# Patient Record
Sex: FEMALE | Race: WHITE
[De-identification: names, ages, dates, MRNs, and addresses within clinical notes are randomized per-mention and may not be internally consistent; named-entity substitution may affect disease eponyms.]

---

## 2018-04-21 ENCOUNTER — HEALTH MAINTENANCE LETTER (OUTPATIENT)
Age: 33
End: 2018-04-21

## 2019-01-18 ENCOUNTER — OFFICE VISIT (OUTPATIENT)
Dept: PEDIATRICS | Facility: CLINIC | Age: 34
End: 2019-01-18
Payer: COMMERCIAL

## 2019-01-18 VITALS
OXYGEN SATURATION: 97 % | HEIGHT: 66 IN | BODY MASS INDEX: 38.39 KG/M2 | DIASTOLIC BLOOD PRESSURE: 76 MMHG | TEMPERATURE: 99.8 F | HEART RATE: 98 BPM | SYSTOLIC BLOOD PRESSURE: 124 MMHG | WEIGHT: 238.9 LBS

## 2019-01-18 DIAGNOSIS — T78.3XXA ANGIOEDEMA, INITIAL ENCOUNTER: Primary | ICD-10-CM

## 2019-01-18 PROCEDURE — 99213 OFFICE O/P EST LOW 20 MIN: CPT | Performed by: INTERNAL MEDICINE

## 2019-01-18 RX ORDER — PREDNISONE 20 MG/1
40 TABLET ORAL DAILY
Qty: 10 TABLET | Refills: 0 | Status: SHIPPED | OUTPATIENT
Start: 2019-01-18 | End: 2019-01-23

## 2019-01-18 RX ORDER — LEVOCETIRIZINE DIHYDROCHLORIDE 5 MG/1
5 TABLET, FILM COATED ORAL EVERY EVENING
COMMUNITY

## 2019-01-18 ASSESSMENT — MIFFLIN-ST. JEOR: SCORE: 1792.45

## 2019-01-18 NOTE — PATIENT INSTRUCTIONS
1. Prednisone 40 mg once a day for 5 days   - take with food  - take today and then move to the morning  2. Schedule with Dr. Mcintyre  3. Follow-up if worsening or not improving

## 2019-01-18 NOTE — PROGRESS NOTES
SUBJECTIVE:   Aleksandra Ba is a 34 year old female who presents to clinic today for the following health issues:      Allergic response      Duration: 2 days    Description (location/character/radiation): eyes swollen    Intensity:  severe    Accompanying signs and symptoms: none    History (similar episodes/previous evaluation): None    Precipitating or alleviating factors: ate avocado for dinner, also has h/o angioedema    Therapies tried and outcome: Benadryl     History of angioedema - doesn't take NSAIDs, red wine. Sometimes flares up without any known trigger. Ate avocado for dinner the night this occurred. Has had in the past and was fine. Has had angioedema since age 12. Has had any portion of face affected. Now happens a few times a year. Usually lasts about a day. The last few years has taken longer to resolve, but overall episodes are less severe. Has not had airway involvement for a long time.    This time, had swelling of both eyes. Yesterday right eye almost completely swollen shut. That is better today and now left almost completely swollen shut. Taking benadryl. Hasn't really helped. Has used prednisone in past. Not sure if it helps. Wouldn't have come in, but needs letter for work    Has met with allergist. Has not had testing for hereditary angioedema done. Only a couple of people in the [facility] test for this. Dr. Mcintyre with Immunology is one of them. He books out 6 months. Doesn't have schedule that far out and difficult to book.    Reviewed and updated as needed this visit by clinical staff  Tobacco  Allergies  Meds  Problems  Med Hx  Surg Hx  Fam Hx  Soc Hx        Reviewed and updated as needed this visit by Provider  Tobacco  Allergies  Meds  Problems  Med Hx  Surg Hx  Fam Hx         -------------------------------------    ROS:  Constitutional, HEENT, cardiovascular, pulmonary, gi and gu systems are negative, except as otherwise noted.    OBJECTIVE:                    "                                 /76 (BP Location: Right arm, Patient Position: Sitting, Cuff Size: Adult Large)   Pulse 98   Temp 99.8  F (37.7  C) (Tympanic)   Ht 1.664 m (5' 5.5\")   Wt 108.4 kg (238 lb 14.4 oz)   SpO2 97%   BMI 39.15 kg/m     Body mass index is 39.15 kg/m .  General Appearance: healthy, alert and no distress  Eyes: right eye with slight swelling under eye. Normal conjunctiva. Left eye completely swollen shut. Unable to see conjunctiva. Skin not erythematous.  Neck: Supple.  No adenopathy, no asymmetry  Respiratory: lungs clear to auscultation - no rales, rhonchi or wheezes.      Diagnostic Test Results:  none      ASSESSMENT/PLAN:                                                      (T78.3XXA) Angioedema, initial encounter  (primary encounter diagnosis)  Comment: given history and the fact eyes are switching most likely angioedema. Also could be preseptal cellulitis but much less likely without erythema and without it being the same eye both days  Plan: predniSONE (DELTASONE) 20 MG tablet, IMMUNOLOGY        REFERRAL  - will try prednisone given more swollen than typical. 40 mg daily for 5 days - discussed side effects  - referral placed for Dr. Mcintyre. Recommend schedule to get testing done  - if not hereditary angioedema, recommend f/u with allergist to see if there are any more new treatments for idiopathic angioedema as she has not seen them for 10 year or so  - discussed concerning signs/symptoms for infection and reasons to get rechecked    FUTURE APPOINTMENTS:       - Follow-up for annual visit or as needed    [facility] PATRICIA          "

## 2019-01-18 NOTE — LETTER
January 18, 2019      Aleksandra Ba  [address] [place] [id number]        To Whom It May Concern:    Aleksandra Ba  was seen in clinic today for an allergic reaction.  Please excuse her  From work 1/17/2019-1/18/2019. She may return to work on 1/19/2019 without restrictions if she is feeling better; however, it might take a couple more days to resolve.    Please let me know if you have questions.      Sincerely,        Taryn Alfonso MD

## 2019-12-16 ENCOUNTER — HEALTH MAINTENANCE LETTER (OUTPATIENT)
Age: 34
End: 2019-12-16

## 2021-01-15 ENCOUNTER — HEALTH MAINTENANCE LETTER (OUTPATIENT)
Age: 36
End: 2021-01-15

## 2021-09-05 ENCOUNTER — HEALTH MAINTENANCE LETTER (OUTPATIENT)
Age: 36
End: 2021-09-05

## 2022-02-20 ENCOUNTER — HEALTH MAINTENANCE LETTER (OUTPATIENT)
Age: 37
End: 2022-02-20

## 2022-10-22 ENCOUNTER — HEALTH MAINTENANCE LETTER (OUTPATIENT)
Age: 37
End: 2022-10-22

## 2023-04-01 ENCOUNTER — HEALTH MAINTENANCE LETTER (OUTPATIENT)
Age: 38
End: 2023-04-01